# Patient Record
Sex: MALE | Race: WHITE | NOT HISPANIC OR LATINO | Employment: STUDENT | URBAN - METROPOLITAN AREA
[De-identification: names, ages, dates, MRNs, and addresses within clinical notes are randomized per-mention and may not be internally consistent; named-entity substitution may affect disease eponyms.]

---

## 2023-10-05 ENCOUNTER — OFFICE VISIT (OUTPATIENT)
Dept: URGENT CARE | Facility: CLINIC | Age: 18
End: 2023-10-05
Payer: COMMERCIAL

## 2023-10-05 VITALS
BODY MASS INDEX: 17.98 KG/M2 | RESPIRATION RATE: 16 BRPM | HEART RATE: 88 BPM | DIASTOLIC BLOOD PRESSURE: 58 MMHG | HEIGHT: 68 IN | TEMPERATURE: 97.4 F | SYSTOLIC BLOOD PRESSURE: 108 MMHG | OXYGEN SATURATION: 99 % | WEIGHT: 118.6 LBS

## 2023-10-05 DIAGNOSIS — B34.9 ACUTE VIRAL SYNDROME: ICD-10-CM

## 2023-10-05 DIAGNOSIS — J02.9 SORE THROAT: ICD-10-CM

## 2023-10-05 LAB
FLUAV RNA SPEC QL NAA+PROBE: NEGATIVE
FLUBV RNA SPEC QL NAA+PROBE: NEGATIVE
HETEROPH AB SER QL LA: NEGATIVE
POCT INT CON NEG: NEGATIVE
POCT INT CON POS: POSITIVE
RSV RNA SPEC QL NAA+PROBE: NEGATIVE
S PYO DNA SPEC NAA+PROBE: NOT DETECTED
SARS-COV-2 RNA RESP QL NAA+PROBE: NEGATIVE

## 2023-10-05 PROCEDURE — 3078F DIAST BP <80 MM HG: CPT | Performed by: STUDENT IN AN ORGANIZED HEALTH CARE EDUCATION/TRAINING PROGRAM

## 2023-10-05 PROCEDURE — 3074F SYST BP LT 130 MM HG: CPT | Performed by: STUDENT IN AN ORGANIZED HEALTH CARE EDUCATION/TRAINING PROGRAM

## 2023-10-05 PROCEDURE — 86308 HETEROPHILE ANTIBODY SCREEN: CPT | Performed by: STUDENT IN AN ORGANIZED HEALTH CARE EDUCATION/TRAINING PROGRAM

## 2023-10-05 PROCEDURE — 99203 OFFICE O/P NEW LOW 30 MIN: CPT | Performed by: STUDENT IN AN ORGANIZED HEALTH CARE EDUCATION/TRAINING PROGRAM

## 2023-10-05 PROCEDURE — 0241U POCT CEPHEID COV-2, FLU A/B, RSV - PCR: CPT | Performed by: STUDENT IN AN ORGANIZED HEALTH CARE EDUCATION/TRAINING PROGRAM

## 2023-10-05 PROCEDURE — 87651 STREP A DNA AMP PROBE: CPT | Performed by: STUDENT IN AN ORGANIZED HEALTH CARE EDUCATION/TRAINING PROGRAM

## 2023-10-05 RX ORDER — VERAPAMIL HYDROCHLORIDE 40 MG/1
TABLET ORAL
COMMUNITY
Start: 2023-09-13

## 2023-10-05 RX ORDER — SUMATRIPTAN 50 MG/1
TABLET, FILM COATED ORAL
COMMUNITY
Start: 2023-09-16

## 2023-10-05 RX ORDER — BUPROPION HYDROCHLORIDE 150 MG/1
TABLET ORAL
COMMUNITY
Start: 2023-09-16

## 2023-10-05 NOTE — PROGRESS NOTES
"Subjective:   CHIEF COMPLAINT  Chief Complaint   Patient presents with    Fever     X3days Sore throat/headache/body aches/loss of appetite/fatigue       [facility]  Joce Hendricks is a 17 y.o. male who presents with a chief complaint of tactile fever, fatigue and sore throat x4 days.  Says he tried NyQuil and DayQuil which provided limited relief of symptoms.  Denies experiencing any cough, wheezing or shortness of breath.  No nausea or vomiting.  No known sick contacts.    REVIEW OF SYSTEMS  General: Admits tactile fever.  GI: no nausea or vomiting  See HPI for further details.    PAST MEDICAL HISTORY  There are no problems to display for this patient.      SURGICAL HISTORY  patient denies any surgical history    ALLERGIES  No Known Allergies    CURRENT MEDICATIONS  Home Medications       Reviewed by Fred Scott D.O. (Physician) on 10/06/23 at 0848  Med List Status: <None>     Medication Last Dose Status   buPROPion (WELLBUTRIN XL) 150 MG XL tablet Taking Active   SUMAtriptan (IMITREX) 50 MG Tab PRN Active   verapamil (ISOPTIN) 40 MG Tab Taking Active                    SOCIAL HISTORY  Social History     Tobacco Use    Smoking status: Never    Smokeless tobacco: Never   Vaping Use    Vaping Use: Never used   Substance and Sexual Activity    Alcohol use: Never    Drug use: Never    Sexual activity: Not on file       FAMILY HISTORY  No family history on file.       Objective:   PHYSICAL EXAM  VITAL SIGNS: /58 (BP Location: Left arm, Patient Position: Sitting)   Pulse 88   Temp 36.3 °C (97.4 °F) (Temporal)   Resp 16   Ht 1.727 m (5' 8\")   Wt 53.8 kg (118 lb 9.6 oz)   SpO2 99%   BMI 18.03 kg/m²     Gen: no acute distress, normal voice  Skin: dry, intact, moist mucosal membranes  Eyes: No conjunctival injection b/l  Neck: Normal range of motion. No meningeal signs.   ENT: Diffuse posterior oropharyngeal oropharyngeal erythema without exudates. Uvula midline.  No edema of the soft palate.  TMs clear and " intact b/l w/o bulging, erythema or effusion.  Right anterior lymphadenopathy.  Lungs: No increased work of breathing.  CTAB w/ symmetric expansion  CV: RRR w/o murmurs or clicks  Psych: normal affect, normal judgement, alert, awake    POC Strep (cepheid - PCR): Negative  POC COVID, influenza, RSV: Negative  POC mono: Negative    Assessment/Plan:     1. Acute viral syndrome        2. Sore throat  POCT CEPHEID COV-2, FLU A/B, RSV - PCR    POCT CEPHEID GROUP A STREP - PCR    POCT Mononucleosis (mono)      Signs and symptoms are consistent with a viral respiratory infection and should be self-limiting.  Recommended symptomatic treatment including Motrin, Tylenol, relative rest and fluid hydration. Return to urgent care any new/worsening symptoms or further questions or concerns.  Patient understood everything discussed.  All questions were answered.    Differential diagnosis and supportive care discussed. Follow-up as needed if symptoms worsen or fail to improve to PCP, Urgent care or Emergency Room.    Please note that this dictation was created using voice recognition software. I have made a reasonable attempt to correct obvious errors, but I expect that there are errors of grammar and possibly content that I did not discover before finalizing the note.

## 2024-12-03 ENCOUNTER — OFFICE VISIT (OUTPATIENT)
Dept: URGENT CARE | Facility: CLINIC | Age: 19
End: 2024-12-03
Payer: COMMERCIAL

## 2024-12-03 VITALS
BODY MASS INDEX: 18.55 KG/M2 | SYSTOLIC BLOOD PRESSURE: 102 MMHG | TEMPERATURE: 97.6 F | HEIGHT: 68 IN | OXYGEN SATURATION: 97 % | DIASTOLIC BLOOD PRESSURE: 60 MMHG | WEIGHT: 122.4 LBS | RESPIRATION RATE: 14 BRPM | HEART RATE: 114 BPM

## 2024-12-03 DIAGNOSIS — R50.9 FEVER, UNSPECIFIED FEVER CAUSE: ICD-10-CM

## 2024-12-03 DIAGNOSIS — U07.1 COVID: ICD-10-CM

## 2024-12-03 LAB
FLUAV RNA SPEC QL NAA+PROBE: NEGATIVE
FLUBV RNA SPEC QL NAA+PROBE: NEGATIVE
RSV RNA SPEC QL NAA+PROBE: NEGATIVE
S PYO DNA SPEC NAA+PROBE: NOT DETECTED
SARS-COV-2 RNA RESP QL NAA+PROBE: POSITIVE

## 2024-12-03 PROCEDURE — 0241U POCT CEPHEID COV-2, FLU A/B, RSV - PCR: CPT | Performed by: NURSE PRACTITIONER

## 2024-12-03 PROCEDURE — 99213 OFFICE O/P EST LOW 20 MIN: CPT | Performed by: NURSE PRACTITIONER

## 2024-12-03 PROCEDURE — 3074F SYST BP LT 130 MM HG: CPT | Performed by: NURSE PRACTITIONER

## 2024-12-03 PROCEDURE — 87651 STREP A DNA AMP PROBE: CPT | Performed by: NURSE PRACTITIONER

## 2024-12-03 PROCEDURE — 3078F DIAST BP <80 MM HG: CPT | Performed by: NURSE PRACTITIONER

## 2024-12-03 NOTE — LETTER
December 4, 2024         Patient: Joce Hendricks   YOB: 2005   Date of Visit: 12/3/2024           To Whom it May Concern:    Joce Hendricks was seen in my clinic on 12/3/2024. He may return to school on 12/6/24.    If you have any questions or concerns, please don't hesitate to call.        Sincerely,           BOBBY Chopra.  Electronically Signed

## 2024-12-04 ENCOUNTER — TELEPHONE (OUTPATIENT)
Dept: URGENT CARE | Facility: CLINIC | Age: 19
End: 2024-12-04
Payer: COMMERCIAL

## 2024-12-04 ASSESSMENT — ENCOUNTER SYMPTOMS
NAUSEA: 0
COUGH: 1
DIARRHEA: 0
CHILLS: 1
HEADACHES: 1
WHEEZING: 0
FEVER: 1
SORE THROAT: 1

## 2024-12-04 NOTE — TELEPHONE ENCOUNTER
Pt called, stated that he tested positive for covid. He's requesting a dr's note to excuse him from school starting 12/3/24-to whatever date he needs to be out due to covid. Please assist, thank you.

## 2024-12-04 NOTE — PROGRESS NOTES
"Subjective:   Joce Hendricks is a 19 y.o. male who presents for Fever (X 4 days ago nausea and vomiting now fever/ headaches/ went to  last night and was given tramadol and it did not help)      Fever   This is a new problem. Episode onset: 4 days; patient seen in outside facility in the ER given tramadol for headache diagnosed with viral URI. The problem occurs constantly. The temperature was taken using a tympanic thermometer. Associated symptoms include congestion, coughing, headaches, muscle aches, sleepiness and a sore throat. Pertinent negatives include no diarrhea, ear pain, nausea, rash or wheezing. He has tried acetaminophen and NSAIDs (Tramadol in the emergency room) for the symptoms. The treatment provided no relief.   Risk factors: no recent sickness and no sick contacts        Review of Systems   Constitutional:  Positive for chills, fever and malaise/fatigue.   HENT:  Positive for congestion and sore throat. Negative for ear pain.    Respiratory:  Positive for cough. Negative for wheezing.    Gastrointestinal:  Negative for diarrhea and nausea.   Skin:  Negative for rash.   Neurological:  Positive for headaches.       Medications:    buPROPion  SUMAtriptan Tabs  verapamil Tabs    Allergies: Patient has no known allergies.    Problem List: Joce Hendricks does not have a problem list on file.    Surgical History:  No past surgical history on file.    Past Social Hx: Joce Hendricks  reports that he has never smoked. He has never used smokeless tobacco. He reports that he does not drink alcohol and does not use drugs.     Past Family Hx:  Joce Hendricks family history is not on file.     Problem list, medications, and allergies reviewed by myself today in Epic.     Objective:     /60 (BP Location: Left arm, Patient Position: Sitting, BP Cuff Size: Adult)   Pulse (!) 114   Temp 36.4 °C (97.6 °F)   Resp 14   Ht 1.727 m (5' 8\")   Wt 55.5 kg (122 lb 6.4 oz)   SpO2 97%   BMI 18.61 kg/m² "     Physical Exam  Vitals and nursing note reviewed.   Constitutional:       General: He is not in acute distress.     Appearance: He is well-developed.   HENT:      Head: Normocephalic and atraumatic.      Right Ear: Tympanic membrane and external ear normal.      Left Ear: Tympanic membrane and external ear normal.      Nose: Nose normal.      Right Sinus: No maxillary sinus tenderness or frontal sinus tenderness.      Left Sinus: No maxillary sinus tenderness or frontal sinus tenderness.      Mouth/Throat:      Mouth: Mucous membranes are moist.      Pharynx: Uvula midline. No posterior oropharyngeal erythema.      Tonsils: No tonsillar exudate or tonsillar abscesses.   Eyes:      General:         Right eye: No discharge.         Left eye: No discharge.      Conjunctiva/sclera: Conjunctivae normal.   Neck:      Meningeal: Brudzinski's sign and Kernig's sign absent.   Cardiovascular:      Rate and Rhythm: Tachycardia present.      Heart sounds: Normal heart sounds, S1 normal and S2 normal.   Pulmonary:      Effort: Pulmonary effort is normal. No respiratory distress.      Breath sounds: Normal breath sounds.   Abdominal:      General: There is no distension.   Musculoskeletal:         General: Normal range of motion.      Cervical back: Full passive range of motion without pain.   Skin:     General: Skin is warm and dry.   Neurological:      General: No focal deficit present.      Mental Status: He is alert and oriented to person, place, and time. Mental status is at baseline. He is not disoriented.      GCS: GCS eye subscore is 4. GCS verbal subscore is 5. GCS motor subscore is 6.      Cranial Nerves: No cranial nerve deficit.      Sensory: No sensory deficit.      Gait: Gait (gait at baseline) normal.      Deep Tendon Reflexes: Reflexes are normal and symmetric.   Psychiatric:         Judgment: Judgment normal.         Assessment/Plan:     Diagnosis and associated orders:     1. Fever, unspecified fever cause   POCT GROUP A STREP, PCR    POCT CoV-2, Flu A/B, RSV by PCR      2. COVID           Comments/MDM:     Results for orders placed or performed in visit on 12/03/24   POCT GROUP A STREP, PCR    Collection Time: 12/03/24  8:00 PM   Result Value Ref Range    POC Group A Strep, PCR Not Detected Not Detected, Invalid   POCT CoV-2, Flu A/B, RSV by PCR    Collection Time: 12/03/24  8:26 PM   Result Value Ref Range    SARS-CoV-2 by PCR Positive (A) Negative, Invalid    Influenza virus A RNA Negative Negative, Invalid    Influenza virus B, PCR Negative Negative, Invalid    RSV, PCR Negative Negative, Invalid       Symptomatic and supportive care:   Plenty of oral hydration and rest   Over the counter cough suppressant as directed.  Tylenol or ibuprofen for pain and fever as directed.   Warm salt water gargles for sore throat, soft foods, cool liquids.   Saline nasal spray and Flonase  Infection control measures at home. Stay away from people, Hand washing, covering sneeze/cough, disinfect surfaces.   Remain home from work, school, and other populated environments.    Overall, the patient is well-appearing. They are not hypoxic, afebrile, and a normal pulmonary exam.                   Please note that this dictation was created using voice recognition software. I have made a reasonable attempt to correct obvious errors, but I expect that there are errors of grammar and possibly content that I did not discover before finalizing the note.    This note was electronically signed by Henry VALENTIN.